# Patient Record
Sex: FEMALE | Race: WHITE | Employment: PART TIME | URBAN - METROPOLITAN AREA
[De-identification: names, ages, dates, MRNs, and addresses within clinical notes are randomized per-mention and may not be internally consistent; named-entity substitution may affect disease eponyms.]

---

## 2022-02-11 ENCOUNTER — VBI (OUTPATIENT)
Dept: ADMINISTRATIVE | Facility: OTHER | Age: 52
End: 2022-02-11

## 2022-02-11 NOTE — TELEPHONE ENCOUNTER
Upon review of the In Basket request we were able to locate, review, and update the patient chart as requested for Pap Smear (HPV) aka Cervical Cancer Screening  Any additional questions or concerns should be emailed to the Practice Liaisons via [email or web address] email, please do not reply via In Basket      Thank you  Yvette Osborne

## 2022-06-16 ENCOUNTER — OFFICE VISIT (OUTPATIENT)
Dept: OBGYN CLINIC | Facility: CLINIC | Age: 52
End: 2022-06-16
Payer: COMMERCIAL

## 2022-06-16 VITALS
SYSTOLIC BLOOD PRESSURE: 128 MMHG | DIASTOLIC BLOOD PRESSURE: 70 MMHG | BODY MASS INDEX: 21.52 KG/M2 | WEIGHT: 142 LBS | HEIGHT: 68 IN

## 2022-06-16 DIAGNOSIS — Z01.419 ENCOUNTER FOR GYNECOLOGICAL EXAMINATION WITHOUT ABNORMAL FINDING: Primary | ICD-10-CM

## 2022-06-16 DIAGNOSIS — Z12.4 SCREENING FOR CERVICAL CANCER: ICD-10-CM

## 2022-06-16 DIAGNOSIS — Z12.31 ENCOUNTER FOR SCREENING MAMMOGRAM FOR MALIGNANT NEOPLASM OF BREAST: ICD-10-CM

## 2022-06-16 DIAGNOSIS — N95.1 PERIMENOPAUSAL SYMPTOMS: ICD-10-CM

## 2022-06-16 PROBLEM — Z12.39 SCREENING FOR BREAST CANCER USING NON-MAMMOGRAM MODALITY: Status: ACTIVE | Noted: 2022-01-01

## 2022-06-16 PROBLEM — C44.91 BASAL CELL CARCINOMA: Status: ACTIVE | Noted: 2022-03-01

## 2022-06-16 PROBLEM — N80.9 ENDOMETRIOSIS: Status: ACTIVE | Noted: 2022-06-16

## 2022-06-16 PROCEDURE — S0612 ANNUAL GYNECOLOGICAL EXAMINA: HCPCS | Performed by: OBSTETRICS & GYNECOLOGY

## 2022-06-16 RX ORDER — IBUPROFEN 800 MG/1
TABLET ORAL AS NEEDED
COMMUNITY

## 2022-06-16 NOTE — PROGRESS NOTES
[address]   [address], [address], [facility]    ASSESSMENT/PLAN: Emigdiotamanna King is a 49 y o  Christine Kipper who presents for annual gynecologic exam     Encounter for routine gynecologic examination  - Routine well woman exam completed today  - Cervical Cancer Screening: Current ASCCP Guidelines reviewed  Last Pap: 02/22/2018   Next Pap Due: today  - Contraceptive counseling discussed  Current contraception none hx of infertility   - Breast Cancer Screening: Last Mammogram Not on file, Thermagram at [facility] on ACOG recommmendations  - Colorectal cancer screening  Encouraged   - The following were reviewed in today's visit: breast self exam, mammography screening ordered, use and side effects of HRT, menopause, osteoporosis, adequate intake of calcium and vitamin D, exercise, healthy diet and colonoscopy discussed     Additional problems addressed during this visit:  1  WEll woman  exam    2  Encounter for screening mammogram for malignant neoplasm of breast  -     Mammo screening bilateral w 3d & cad; Future; Expected date: 06/16/2023    3  Screening for cervical cancer  -     IGP, rfx Aptima HPV ASCU    4  Perimenopausal symptoms  Comments:  DW pt  biolidentical  vs  HRT andlabs   Treat the symptoms  Pt desires  integrated treatments   Using  CBT    Diet and exercise  dw pt along w  menopause  S+S/   STEPHANIE  Instructed on  Core exercise and PT for  Pelvic  Floor  instruction  TC Core  3    CC: Annual Gynecologic Examination    HPI: Emigdio King is a 49 y o  Christine Kipper who presents for annual gynecologic examination  50 yo here for wellness  Exam    + hx fo hsv   No out breaks  + desires   Bio identical  HRT     + hot flashes , night sweats and  Brain fog  Doing a SEED programm   + STEPHANIE w  Coughing and sneezing  Desires prevention of the  Three   Problems of  Menopause     Osteoporosis,   CVD and   Dementia      + hx of  Endometriosis and   Dyspareunia   + integrative  Medicine  RN and  Health   The following portions of the patient's history were reviewed and updated as appropriate: She  has a past medical history of Basal cell carcinoma (03/2022), Endometriosis, Kidney stones, Osteoporosis (06/06/2022), Pap test, as part of routine gynecological examination (2018), and Screening for breast cancer using non-mammogram modality (01/2022)  She  has a past surgical history that includes Gynecologic cryosurgery and Mammo (historical)  Her family history includes Hypertension in her father and mother; No Known Problems in her brother  She  reports that she has never smoked  She has never used smokeless tobacco  She reports previous alcohol use  She reports that she does not use drugs  Current Outpatient Medications   Medication Sig Dispense Refill    ibuprofen (IBU) 800 mg tablet if needed       No current facility-administered medications for this visit  She is allergic to sulfa antibiotics and tetracycline       Review of Systems:  All systems normal except as noted in HPI          Objective:  /70   Ht 5' 7 5" (1 715 m)   Wt 64 4 kg (142 lb)   LMP 06/06/2022 (Exact Date)   Breastfeeding No   BMI 21 91 kg/m²    Physical Exam  Vitals and nursing note reviewed  Constitutional:       Appearance: Normal appearance  HENT:      Head: Normocephalic  Cardiovascular:      Rate and Rhythm: Normal rate and regular rhythm  Pulses: Normal pulses  Heart sounds: Normal heart sounds  Pulmonary:      Effort: Pulmonary effort is normal       Breath sounds: Normal breath sounds  Chest:      Chest wall: No mass, lacerations, swelling, tenderness or edema  Breasts: Erick Score is 4  Breasts are symmetrical       Right: Normal  No swelling, bleeding, inverted nipple, mass, nipple discharge, skin change, tenderness, axillary adenopathy or supraclavicular adenopathy        Left: No swelling, bleeding, inverted nipple, mass, nipple discharge, skin change, tenderness, axillary adenopathy or supraclavicular adenopathy  Abdominal:      General: Abdomen is flat  Bowel sounds are normal       Palpations: Abdomen is soft  Genitourinary:     General: Normal vulva  Exam position: Lithotomy position  Pubic Area: No rash  Erick stage (genital): 5  Labia:         Right: No rash, tenderness or lesion  Left: No rash, tenderness or lesion  Urethra: No urethral pain, urethral swelling or urethral lesion  Vagina: Normal       Cervix: No discharge or lesion  Uterus: Normal        Adnexa: Right adnexa normal and left adnexa normal       Rectum: Normal          Musculoskeletal:         General: Normal range of motion  Cervical back: Normal range of motion and neck supple  Lymphadenopathy:      Upper Body:      Right upper body: No supraclavicular, axillary or pectoral adenopathy  Left upper body: No supraclavicular, axillary or pectoral adenopathy  Lower Body: No right inguinal adenopathy  No left inguinal adenopathy  Skin:     General: Skin is warm and dry  Neurological:      General: No focal deficit present  Mental Status: She is alert and oriented to person, place, and time  Psychiatric:         Mood and Affect: Mood normal          Behavior: Behavior normal          Thought Content:  Thought content normal          Judgment: Judgment normal

## 2022-06-20 LAB
CYTOLOGIST CVX/VAG CYTO: NORMAL
DX ICD CODE: NORMAL
OTHER STN SPEC: NORMAL
OTHER STN SPEC: NORMAL
PATH REPORT.FINAL DX SPEC: NORMAL
SL AMB NOTE:: NORMAL
SL AMB SPECIMEN ADEQUACY: NORMAL
SL AMB TEST METHODOLOGY: NORMAL

## 2022-10-11 PROBLEM — Z12.39 SCREENING FOR BREAST CANCER USING NON-MAMMOGRAM MODALITY: Status: RESOLVED | Noted: 2022-01-01 | Resolved: 2022-10-11

## 2023-11-02 ENCOUNTER — TELEPHONE (OUTPATIENT)
Dept: OBGYN CLINIC | Facility: CLINIC | Age: 53
End: 2023-11-02

## 2023-11-02 NOTE — TELEPHONE ENCOUNTER
Zachary Coats is requesting to speak with a physician whom has a lot of experience with HRT and post menopausal women. Transferred Zachary Coats to  to schedule OC appt. Maximino Schmidt

## 2024-01-13 NOTE — PROGRESS NOTES
Assessment/Plan:    Extremely dense tissue of both breasts on mammography  Last mammo 7/31/23 reviewed on [employer] BIRADS 1D. Interested in annual imaging with breast u/s. Has had thermogram in past, discussed limitations.   Reviewed importance of self and clinical breast exams. Will RTO for well check 4 months.     Perimenopausal symptoms  54 yo G0 LMP 6/2023 with perimenopausal symptoms including hot flashes, sleep disturbances, mood changes, dry skin. Established care with outside provider and started on Prometrium, adjusting dose for symptom relief until now feeling well (sleeping, temperature regulated, mood stable) with 600 mg daily. Does not take estradiol.   Had testing done with varying estrogen levels and has been working to balance that.     Reviewed PCP ordered [facility] testing with premenopausal FSH levels noted in 12/2023.     Discussed with patient perimenopausal symptoms. Aware of risks of estrogen including increased risk of clots in legs and lungs and slight increase risk of breast cancer with combination HRT but no statistically elevated risk.   Interested in continuing current regimen, not opposed to estradiol patch when needed.   Aware of high dose of progesterone currently taking larger than typically prescribed. Aware would add to amenorrhea state as inhibits endometrial proliferation.   Questions answered, will contact when refill needed. Will request 180 tablets for up to 600 mg of progesterone daily when needed.   Offered evaluation with menopause support team if desired, declines at this time.          Diagnoses and all orders for this visit:    Perimenopausal symptoms    Other orders  -     Liquid-based pap, screening  -     Liquid-based pap, screening  -     HYDROcodone-acetaminophen (NORCO) 5-325 mg per tablet; if needed  -     Progesterone 100 MG CAPS; TAKE 3-4 CAPSULES AT BEDTIME AS DIRECTED          Subjective:      Patient ID: Sherine Calderon is a 53 y.o. female.    HPI Here to  "discuss management of perimenopausal symptoms.    The following portions of the patient's history were reviewed and updated as appropriate: She  has a past medical history of Basal cell carcinoma (03/2022), Breast cancer screening other than mammogram - screening u/s (12/26/2023), Endometriosis, Genital warts, Herpes, Kidney stone, Kidney stones, Osteoporosis (06/06/2022), Pap test, as part of routine gynecological examination (2018), and Screening for breast cancer using non-mammogram modality (01/2022).  She   Patient Active Problem List    Diagnosis Date Noted    Extremely dense tissue of both breasts on mammography 01/16/2024    Perimenopausal symptoms 01/16/2024    Endometriosis 06/16/2022    Basal cell carcinoma 03/2022     She  has a past surgical history that includes Gynecologic cryosurgery; Mammo (historical); Condyloma excision/fulguration; and Mammo (historical) (07/31/2023).  Her family history includes Hypertension in her father and mother; No Known Problems in her brother.  She  reports that she has never smoked. She has never used smokeless tobacco. She reports that she does not currently use alcohol. She reports that she does not currently use drugs.  Current Outpatient Medications   Medication Sig Dispense Refill    HYDROcodone-acetaminophen (NORCO) 5-325 mg per tablet if needed      Progesterone 100 MG CAPS TAKE 3-4 CAPSULES AT BEDTIME AS DIRECTED       No current facility-administered medications for this visit.     She is allergic to eggs or egg-derived products - food allergy, sulfa antibiotics, and tetracycline..    Review of Systems  see above    Objective:      /68   Ht 5' 7\" (1.702 m)   Wt 63.7 kg (140 lb 6.4 oz)   Breastfeeding No   BMI 21.99 kg/m²          Physical Exam    Appears well, no apparent distress.   Does not appear anxious or depressed.    I have spent a total time of 35 minutes on 01/16/24 in caring for this patient including Diagnostic results, Risks and benefits of " tx options, Instructions for management, Impressions, Counseling / Coordination of care, Documenting in the medical record, Reviewing / ordering tests, medicine, procedures  , Obtaining or reviewing history  , and Communicating with other healthcare professionals .

## 2024-01-16 ENCOUNTER — OFFICE VISIT (OUTPATIENT)
Dept: OBGYN CLINIC | Facility: CLINIC | Age: 54
End: 2024-01-16
Payer: COMMERCIAL

## 2024-01-16 VITALS
SYSTOLIC BLOOD PRESSURE: 120 MMHG | BODY MASS INDEX: 22.03 KG/M2 | DIASTOLIC BLOOD PRESSURE: 68 MMHG | HEIGHT: 67 IN | WEIGHT: 140.4 LBS

## 2024-01-16 DIAGNOSIS — N95.1 PERIMENOPAUSAL SYMPTOMS: Primary | ICD-10-CM

## 2024-01-16 PROBLEM — R92.343 EXTREMELY DENSE TISSUE OF BOTH BREASTS ON MAMMOGRAPHY: Status: ACTIVE | Noted: 2024-01-16

## 2024-01-16 PROCEDURE — 99214 OFFICE O/P EST MOD 30 MIN: CPT | Performed by: OBSTETRICS & GYNECOLOGY

## 2024-01-16 RX ORDER — HYDROCODONE BITARTRATE AND ACETAMINOPHEN 5; 325 MG/1; MG/1
TABLET ORAL AS NEEDED
COMMUNITY

## 2024-01-16 RX ORDER — PROGESTERONE 100 MG/1
CAPSULE ORAL
COMMUNITY
Start: 2023-12-22

## 2024-01-16 NOTE — LETTER
January 16, 2024     Alayna Davila [facility]  [facility] [place] [address]    Patient: Sherine Calderon   YOB: 1970   Date of Visit: 1/16/2024       Dear Dr. Davila:     Sherine Calderon was in to see me today in consultation for perimenopausal symptoms. Below are my notes for this consultation.    If you have questions, please do not hesitate to call me. I look forward to following your patient along with you.         Sincerely,        Liliam Edmond MD        CC: No Recipients    Liliam Edmond MD  1/16/2024  2:53 PM  Sign when Signing Visit  Assessment/Plan:    Extremely dense tissue of both breasts on mammography  Last mammo 7/31/23 reviewed on [employer] BIRADS 1D. Interested in annual imaging with breast u/s. Has had thermogram in past, discussed limitations.   Reviewed importance of self and clinical breast exams. Will RTO for well check 4 months.     Perimenopausal symptoms  54 yo G0 LMP 6/2023 with perimenopausal symptoms including hot flashes, sleep disturbances, mood changes, dry skin. Established care with outside provider and started on Prometrium, adjusting dose for symptom relief until now feeling well (sleeping, temperature regulated, mood stable) with 600 mg daily. Does not take estradiol.   Had testing done with varying estrogen levels and has been working to balance that.     Reviewed PCP ordered [facility] testing with premenopausal FSH levels noted in 12/2023.     Discussed with patient perimenopausal symptoms. Aware of risks of estrogen including increased risk of clots in legs and lungs and slight increase risk of breast cancer with combination HRT but no statistically elevated risk.   Interested in continuing current regimen, not opposed to estradiol patch when needed.   Aware of high dose of progesterone currently taking larger than typically prescribed. Aware would add to amenorrhea state as inhibits endometrial proliferation.   Questions answered, will contact when refill needed. Will request 180  tablets for up to 600 mg of progesterone daily when needed.   Offered evaluation with menopause support team if desired, declines at this time.          Diagnoses and all orders for this visit:    Perimenopausal symptoms    Other orders  -     Liquid-based pap, screening  -     Liquid-based pap, screening  -     HYDROcodone-acetaminophen (NORCO) 5-325 mg per tablet; if needed  -     Progesterone 100 MG CAPS; TAKE 3-4 CAPSULES AT BEDTIME AS DIRECTED          Subjective:      Patient ID: Sherine Calderon is a 53 y.o. female.    HPI Here to discuss management of perimenopausal symptoms.    The following portions of the patient's history were reviewed and updated as appropriate: She  has a past medical history of Basal cell carcinoma (03/2022), Breast cancer screening other than mammogram - screening u/s (12/26/2023), Endometriosis, Genital warts, Herpes, Kidney stone, Kidney stones, Osteoporosis (06/06/2022), Pap test, as part of routine gynecological examination (2018), and Screening for breast cancer using non-mammogram modality (01/2022).  She   Patient Active Problem List    Diagnosis Date Noted   • Extremely dense tissue of both breasts on mammography 01/16/2024   • Perimenopausal symptoms 01/16/2024   • Endometriosis 06/16/2022   • Basal cell carcinoma 03/2022     She  has a past surgical history that includes Gynecologic cryosurgery; Mammo (historical); Condyloma excision/fulguration; and Mammo (historical) (07/31/2023).  Her family history includes Hypertension in her father and mother; No Known Problems in her brother.  She  reports that she has never smoked. She has never used smokeless tobacco. She reports that she does not currently use alcohol. She reports that she does not currently use drugs.  Current Outpatient Medications   Medication Sig Dispense Refill   • HYDROcodone-acetaminophen (NORCO) 5-325 mg per tablet if needed     • Progesterone 100 MG CAPS TAKE 3-4 CAPSULES AT BEDTIME AS DIRECTED       No  "current facility-administered medications for this visit.     She is allergic to eggs or egg-derived products - food allergy, sulfa antibiotics, and tetracycline..    Review of Systems  see above    Objective:      /68   Ht 5' 7\" (1.702 m)   Wt 63.7 kg (140 lb 6.4 oz)   Breastfeeding No   BMI 21.99 kg/m²          Physical Exam    Appears well, no apparent distress.   Does not appear anxious or depressed.    I have spent a total time of 35 minutes on 01/16/24 in caring for this patient including Diagnostic results, Risks and benefits of tx options, Instructions for management, Impressions, Counseling / Coordination of care, Documenting in the medical record, Reviewing / ordering tests, medicine, procedures  , Obtaining or reviewing history  , and Communicating with other healthcare professionals .        "

## 2024-01-16 NOTE — ASSESSMENT & PLAN NOTE
54 yo G0 LMP 6/2023 with perimenopausal symptoms including hot flashes, sleep disturbances, mood changes, dry skin. Established care with outside provider and started on Prometrium, adjusting dose for symptom relief until now feeling well (sleeping, temperature regulated, mood stable) with 600 mg daily. Does not take estradiol.   Had testing done with varying estrogen levels and has been working to balance that.     Reviewed PCP ordered [facility] testing with premenopausal FSH levels noted in 12/2023.     Discussed with patient perimenopausal symptoms. Aware of risks of estrogen including increased risk of clots in legs and lungs and slight increase risk of breast cancer with combination HRT but no statistically elevated risk.   Interested in continuing current regimen, not opposed to estradiol patch when needed.   Aware of high dose of progesterone currently taking larger than typically prescribed. Aware would add to amenorrhea state as inhibits endometrial proliferation.   Questions answered, will contact when refill needed. Will request 180 tablets for up to 600 mg of progesterone daily when needed.   Offered evaluation with menopause support team if desired, declines at this time.

## 2024-01-16 NOTE — ASSESSMENT & PLAN NOTE
Last mammo 7/31/23 reviewed on [employer] BIRADS 1D. Interested in annual imaging with breast u/s. Has had thermogram in past, discussed limitations.   Reviewed importance of self and clinical breast exams. Will RTO for well check 4 months.